# Patient Record
Sex: FEMALE | Race: WHITE | NOT HISPANIC OR LATINO | RURAL
[De-identification: names, ages, dates, MRNs, and addresses within clinical notes are randomized per-mention and may not be internally consistent; named-entity substitution may affect disease eponyms.]

---

## 2021-09-29 ENCOUNTER — APPOINTMENT (RX ONLY)
Dept: RURAL CLINIC 1 | Facility: CLINIC | Age: 36
Setting detail: DERMATOLOGY
End: 2021-09-29

## 2021-09-29 DIAGNOSIS — Z41.9 ENCOUNTER FOR PROCEDURE FOR PURPOSES OTHER THAN REMEDYING HEALTH STATE, UNSPECIFIED: ICD-10-CM

## 2021-09-29 PROCEDURE — ? DYSPORT

## 2021-09-29 PROCEDURE — ? COSMETIC CONSULTATION: DYSPORT

## 2021-09-29 NOTE — PROCEDURE: DYSPORT
Show Ucl Units: No
Forehead Units: 42
Show Forehead Units: Yes
Left Periorbital Units: 0
Consent: Written consent obtained. Risks include but not limited to lid/brow ptosis, bruising, swelling, diplopia, temporary effect, incomplete chemical denervation.
Glabellar Complex Units: 42
Reconstitution Date (Optional): 9/13/2021
Post-Care Instructions: Patient instructed to not lie down for 4 hours and limit physical activity for 24 hours.
Expiration Date (Month Year): 5/2022
Inferior Lateral Orbicularis Oculi Units: 12
Detail Level: Detailed
Price (Use Numbers Only, No Special Characters Or $): 408.00
Lot #: [id number]
Additional Comments: Applied $20.00 coupon from [employer]. All consents signed
Dilution (U/ 0.1cc): 4